# Patient Record
Sex: FEMALE
[De-identification: names, ages, dates, MRNs, and addresses within clinical notes are randomized per-mention and may not be internally consistent; named-entity substitution may affect disease eponyms.]

---

## 2023-05-01 ENCOUNTER — NURSE TRIAGE (OUTPATIENT)
Dept: OTHER | Facility: CLINIC | Age: 38
End: 2023-05-01

## 2023-05-01 NOTE — TELEPHONE ENCOUNTER
Location of patient: [place] This must be entered in the demographics. Received call from April at [facility] Name: Gab Moody MRN: [id number]    Subjective: Caller states \"On Friday I had sharp back pain in the middle, lasted 5 hours and states provider offered pt to come to the office, patient states she was unable to go to the office (resolved with rest). On , I started feeling left lower abdominal pain, lasted 4-5 hours (resolved). Today I am having lower back pain, ongoing for 8 hours. \" Started exercises on Saturday morning. Current Symptoms: Lower back pain, cramp in right leg, intermittent coughing 3-4 days    Denies vaginal bleeding, discharge, numbness, weakness, fever, abdominal pain, or bowel or bladder control issues, bleeding or urinary issues. Onset: 8 hours ago; sudden, intermittent - lower back pain 5/10     LMP or Due Date: Due date 2023    Weeks of gestation: 35 weeks    Fetal Movement: the patient states that the movement of the baby is decreased    Active vaginal bleeding: NA  # of pads/hour: NA    Vaginal fluid/discharge:  none    Contractions: none today    Sexual intercourse in last 2 days: YES     (# of pregnancies): 4   Para (# of live births): 1    Past complications with pregnancy/delivery: Gestational DM; 2 miscarriages    Triage indicates for patient to  Go to  L&D now    Care advice provided, patient verbalizes understanding; denies any other questions or concerns; instructed to call back for any new or worsening symptoms. Patient agrees to proceed to L&D. Writer called [facility] L&D, rad w/Mariajose, advised sending patient to L&D, provided needed information.     This triage is a result of a call to the [address] [place] [address]    Reason for Disposition   [1] Pregnant 23 or more weeks AND [2] baby is moving less today (e.g., kick count < 5 in 1 hour or < 10 in 2 hours)    Protocols used: Pregnancy - Back Pain-ADULT-AH